# Patient Record
(demographics unavailable — no encounter records)

---

## 2025-05-27 NOTE — HISTORY OF PRESENT ILLNESS
[FreeTextEntry1] : Pt w/ R Br Mass originally SD (2021) Mass enlarged after delivery (11/23)  > saw Gyn > Mammo/Sono Mammo/Sono (5/2/25): HD, +5 cm lob mass R 11:00 N2 > Bx rec'ed S/P R Sono Core Bx (R 11:00 N2)(5/9/25): Benign PASH w/ focal ID Papilloma, NO atypia No prior Breast Surgery, Breast Procedures or Nipple Discharge o/t lact No FH Breast, Ovarian, Pancreatic Cancer or Melanoma.  +FH Colon Ca (M. GM 60's) No MH/FH changes. ROS reviewed/discussed. LMP (5/25), reg. Taking Ca/Vit D.

## 2025-05-27 NOTE — PHYSICAL EXAM
[Normocephalic] : normocephalic [Atraumatic] : atraumatic [Supple] : supple [No Supraclavicular Adenopathy] : no supraclavicular adenopathy [No Cervical Adenopathy] : no cervical adenopathy [No Thyromegaly] : no thyromegaly [Normal Sinus Rhythm] : normal sinus rhythm [Examined in the supine and seated position] : examined in the supine and seated position [No dominant masses] : no dominant masses in right breast  [No dominant masses] : no dominant masses left breast [No Nipple Retraction] : no left nipple retraction [No Nipple Discharge] : no left nipple discharge [No Axillary Lymphadenopathy] : no left axillary lymphadenopathy [No Edema] : no edema [No Rashes] : no rashes [No Ulceration] : no ulceration [de-identified] : +2x5cm asym th R 11:00 c/t site of concern +dense FC tissue NSF  [de-identified] : +dense FC tissue NSF